# Patient Record
Sex: MALE | Race: OTHER | HISPANIC OR LATINO | URBAN - METROPOLITAN AREA
[De-identification: names, ages, dates, MRNs, and addresses within clinical notes are randomized per-mention and may not be internally consistent; named-entity substitution may affect disease eponyms.]

---

## 2023-11-18 ENCOUNTER — EMERGENCY (EMERGENCY)
Facility: HOSPITAL | Age: 33
LOS: 0 days | Discharge: HOME | End: 2023-11-18
Attending: EMERGENCY MEDICINE
Payer: MEDICAID

## 2023-11-18 VITALS
RESPIRATION RATE: 18 BRPM | SYSTOLIC BLOOD PRESSURE: 134 MMHG | HEIGHT: 67 IN | WEIGHT: 160.06 LBS | OXYGEN SATURATION: 99 % | DIASTOLIC BLOOD PRESSURE: 76 MMHG | TEMPERATURE: 99 F | HEART RATE: 89 BPM

## 2023-11-18 DIAGNOSIS — M25.562 PAIN IN LEFT KNEE: ICD-10-CM

## 2023-11-18 DIAGNOSIS — Z98.890 OTHER SPECIFIED POSTPROCEDURAL STATES: ICD-10-CM

## 2023-11-18 DIAGNOSIS — M25.561 PAIN IN RIGHT KNEE: ICD-10-CM

## 2023-11-18 PROCEDURE — 73562 X-RAY EXAM OF KNEE 3: CPT | Mod: 50

## 2023-11-18 PROCEDURE — 99283 EMERGENCY DEPT VISIT LOW MDM: CPT | Mod: 25

## 2023-11-18 PROCEDURE — 73562 X-RAY EXAM OF KNEE 3: CPT | Mod: 26,50

## 2023-11-18 PROCEDURE — 99284 EMERGENCY DEPT VISIT MOD MDM: CPT

## 2023-11-18 RX ORDER — IBUPROFEN 200 MG
600 TABLET ORAL ONCE
Refills: 0 | Status: COMPLETED | OUTPATIENT
Start: 2023-11-18 | End: 2023-11-18

## 2023-11-18 RX ORDER — ACETAMINOPHEN 500 MG
975 TABLET ORAL ONCE
Refills: 0 | Status: COMPLETED | OUTPATIENT
Start: 2023-11-18 | End: 2023-11-18

## 2023-11-18 RX ADMIN — Medication 975 MILLIGRAM(S): at 13:41

## 2023-11-18 RX ADMIN — Medication 600 MILLIGRAM(S): at 13:41

## 2023-11-18 NOTE — ED PROVIDER NOTE - ATTENDING APP SHARED VISIT CONTRIBUTION OF CARE
32 yo m with pmh of L acl tear, presents with R knee pain, worse since last night.  denies calf pain or swelling.  pain says worse on walking or when straightening the knee.  no trauma.  exam: R knee mildly ttp superior and lateral aspect, mild swelling, FROM passively but with pain on extension, no calf ttp imp: pt with R knee pain, atraumatic, will xray

## 2023-11-18 NOTE — ED PROVIDER NOTE - OBJECTIVE STATEMENT
33-year-old male history of ACL tear presenting to ED for evaluation of bilateral knee pain with swelling.  Patient denies trauma to the knees but states that he was trying to get off of bed and felt sharp pain in his right knee.  Patient has history of ACL tear in his left leg and so he uses his right leg predominantly for getting around.  States he is now having trouble bending the right knee.

## 2023-11-18 NOTE — ED PROVIDER NOTE - PATIENT PORTAL LINK FT
You can access the FollowMyHealth Patient Portal offered by [facility] by registering at the following website: [email or web address]. By joining [facility]’s FollowMyHealth portal, you will also be able to view your health information using other applications (apps) compatible with our system.

## 2023-11-18 NOTE — ED PROVIDER NOTE - NSFOLLOWUPINSTRUCTIONS_ED_ALL_ED_FT
Alannah un seguimiento con ortopedia en 1 a 3 días.    ******** Nuestros coordinadores de referencias del [facility] se comunicarán con usted en las próximas 24 a 48 horas, de 9:00 a. m. a 5:00 p. m., con jewel doe de seguimiento. Espere jewel llamada telefónica del hospital en jameel período de tiempo. Si no recibe jewel llamada o si tiene alguna pregunta o inquietud, puede comunicarse con ellos al [phone].    Dolor payam de rodilla en los adultos  Acute Knee Pain, Adult  El dolor payam de rodilla es repentino y puede deberse a daño, hinchazón o irritación de los músculos y tejidos que sostienen la rodilla. El dolor puede ser consecuencia de lo siguiente:  Jewel caída.  Jewel lesión en la rodilla debido a movimientos de rotación.  Un golpe en la rodilla.  Jewel infección.  El dolor payam de rodilla puede desaparecer solo con el tiempo y el descanso. De no ser así, dow médico podría indicarle que se alannah estudios para hallar la causa del dolor. Pueden incluir:  Estudios de diagnóstico por imágenes, jr jewel radiografía, jewel resonancia magnética (RM), jewel exploración por tomografía computarizada (TC) o jewel ecografía.  Aspiración de jewel articulación. En justin estudio, se extrae líquido de la rodilla y se evalúa.  Artroscopia. En justin estudio, se introduce un tubo iluminado en la rodilla y se proyecta jewel imagen en jewel pantalla de televisión.  Biopsia. En justin estudio, se everton jewel muestra de tejido del organismo y se la estudia con un microscopio.  Siga estas instrucciones en dow casa:  Si tiene jewel rodillera o un dispositivo ortopédico:      Use la rodillera o el dispositivo ortopédico jr se lo haya indicado el médico. Quíteselos solamente jr se lo haya indicado el médico.  Aflójelos si siente hormigueo en los dedos del pie, se le adormecen o se le enfrían y se tornan azulados.  Manténgalos limpios.  Si la rodillera o el dispositivo ortopédico no son impermeables:  No deje que se mojen.  Cúbralos con un envoltorio hermético cuando tome un baño de inmersión o jewel ducha.  Actividad    Descanse la rodilla.  No alannah cosas que le causen dolor o que lo intensifiquen.  Evite las actividades o los ejercicios de alto impacto, jr correr, saltar la soga o hacer rex de tijera.  Trabaje con un fisioterapeuta para crear un programa de ejercicios seguros, según lo recomiende el médico. Alannah ejercicios jr se lo haya indicado el fisioterapeuta.  Control del dolor, la rigidez y la hinchazón      Si se lo indican, aplique hielo sobre la rodilla afectada. Para hacer esto:  Si usa jewel rodillera o un dispositivo ortopédico desmontables, quíteselos según las indicaciones del médico.  Ponga el hielo en jewel bolsa plástica.  Coloque jewel toalla entre la piel y la bolsa.  Aplique el hielo melissa 20 minutos, 2 o 3 veces por día.  Retire el hielo si la piel se pone de color corona brillante. [place] es muy importante. Si no puede sentir dolor, calor o frío, tiene un mayor riesgo de que se dañe la cuate.  Si se lo indican, use jewel venda elástica para ejercer presión (compresión) en la rodilla lesionada. [place] puede controlar la hinchazón, darle sostén y ayudar a aliviar las molestias.  Cuando esté sentado o acostado, levante (eleve) la rodilla por encima del nivel del corazón.  Duerma con jewel almohada debajo de la rodilla.  Indicaciones generales    Use los medicamentos de venta baldo y los recetados solamente jr se lo haya indicado el médico.  No consuma ningún producto que contenga nicotina o tabaco, jr cigarrillos, cigarrillos electrónicos y tabaco de mascar. Si necesita ayuda para dejar de consumir estos productos, consulte al médico.  Si tiene sobrepeso, trabaje con el médico y un nutricionista para establecer jewel meta de pérdida de peso que sea saludable y razonable para usted. El exceso de peso puede generar presión en la rodilla.  Esté atento a cualquier cambio en los síntomas.  Cumpla con todas las visitas de seguimiento. [place] es importante.  Comuníquese con un médico si:  El dolor de rodilla continúa, cambia o empeora.  Tiene fiebre junto con dolor de rodilla.  La rodilla se siente caliente al tacto o está enrojecida.  La rodilla se le tuerce o se le traba.  Solicite ayuda de inmediato si:  La rodilla se hincha, y la hinchazón empeora.  No puede  la rodilla.  Tiene un dolor intenso en la rodilla que no se puede controlar con analgésicos.  Resumen  El dolor payam de rodilla puede deberse a jewel caída, jewel lesión, jewel infección o a daño, hinchazón o irritación de los tejidos que sostienen la rodilla.  El médico podría hacerle estudios para hallar la causa del dolor.  Esté atento a cualquier cambio en los síntomas. Alivie el dolor con descanso, medicamentos, actividad de poca intensidad y el uso de hielo.  Obtenga ayuda de inmediato si la rodilla se hincha, no puede  la rodilla o tiene un dolor intenso que no se puede controlar con medicamentos.

## 2023-11-18 NOTE — ED PROVIDER NOTE - CARE PROVIDER_API CALL
Ghulam Ngo  Orthopaedic Surgery  [address] [place] [address]  Phone: [phone]  Fax: [phone]  Follow Up Time:

## 2023-11-18 NOTE — ED PROVIDER NOTE - CLINICAL SUMMARY MEDICAL DECISION MAKING FREE TEXT BOX
Pt with atraumatic knee pain, xrays neg.  pt to f/u with ortho outpt. Pt was given strict return to ED precautions and pt indicated that he/she understood.

## 2023-11-18 NOTE — ED PROVIDER NOTE - PHYSICAL EXAMINATION
VITAL SIGNS: I have reviewed nursing notes and confirm.  CONSTITUTIONAL: in no acute distress.  SKIN: Skin exam is warm and dry, no acute rash.  HEAD: Normocephalic; atraumatic.  EYES: EOM intact; conjunctiva and sclera clear.  ENT: No nasal discharge; airway clear.   NECK: Supple; non tender.  CARD: S1, S2 normal; no murmurs, gallops, or rubs. Regular rate and rhythm.  RESP: No wheezes, rales or rhonchi. Speaking in full sentences.   ABD: Normal bowel sounds; soft; non-distended; non-tender  EXT: decreased ROM to right leg, pain with flexion of right leg. No clubbing, cyanosis or edema.  NEURO: Alert, oriented. Grossly unremarkable. No focal deficits.

## 2023-11-21 ENCOUNTER — APPOINTMENT (OUTPATIENT)
Dept: ORTHOPEDIC SURGERY | Facility: CLINIC | Age: 33
End: 2023-11-21
Payer: MEDICAID

## 2023-11-21 DIAGNOSIS — S83.91XA SPRAIN OF UNSPECIFIED SITE OF RIGHT KNEE, INITIAL ENCOUNTER: ICD-10-CM

## 2023-11-21 DIAGNOSIS — S83.92XA SPRAIN OF UNSPECIFIED SITE OF LEFT KNEE, INITIAL ENCOUNTER: ICD-10-CM

## 2023-11-21 PROCEDURE — 99203 OFFICE O/P NEW LOW 30 MIN: CPT

## 2023-12-12 ENCOUNTER — APPOINTMENT (OUTPATIENT)
Dept: ORTHOPEDIC SURGERY | Facility: CLINIC | Age: 33
End: 2023-12-12

## 2023-12-19 ENCOUNTER — APPOINTMENT (OUTPATIENT)
Dept: INTERNAL MEDICINE | Facility: CLINIC | Age: 33
End: 2023-12-19
Payer: MEDICAID

## 2023-12-19 ENCOUNTER — OUTPATIENT (OUTPATIENT)
Dept: OUTPATIENT SERVICES | Facility: HOSPITAL | Age: 33
LOS: 1 days | End: 2023-12-19
Payer: MEDICAID

## 2023-12-19 VITALS
DIASTOLIC BLOOD PRESSURE: 85 MMHG | SYSTOLIC BLOOD PRESSURE: 133 MMHG | TEMPERATURE: 97.9 F | HEART RATE: 72 BPM | WEIGHT: 158 LBS | OXYGEN SATURATION: 98 %

## 2023-12-19 VITALS — HEIGHT: 64 IN | BODY MASS INDEX: 27.12 KG/M2

## 2023-12-19 DIAGNOSIS — Z78.9 OTHER SPECIFIED HEALTH STATUS: ICD-10-CM

## 2023-12-19 DIAGNOSIS — Z00.00 ENCOUNTER FOR GENERAL ADULT MEDICAL EXAMINATION WITHOUT ABNORMAL FINDINGS: ICD-10-CM

## 2023-12-19 DIAGNOSIS — Z23 ENCOUNTER FOR IMMUNIZATION: ICD-10-CM

## 2023-12-19 DIAGNOSIS — G43.909 MIGRAINE, UNSPECIFIED, NOT INTRACTABLE, W/OUT STATUS MIGRAINOSUS: ICD-10-CM

## 2023-12-19 DIAGNOSIS — Z00.00 ENCOUNTER FOR GENERAL ADULT MEDICAL EXAMINATION W/OUT ABNORMAL FINDINGS: ICD-10-CM

## 2023-12-19 PROCEDURE — 99203 OFFICE O/P NEW LOW 30 MIN: CPT

## 2023-12-19 PROCEDURE — 90471 IMMUNIZATION ADMIN: CPT | Mod: 25

## 2023-12-19 RX ORDER — MELOXICAM 7.5 MG/1
7.5 TABLET ORAL
Qty: 90 | Refills: 0 | Status: ACTIVE | COMMUNITY
Start: 2023-12-19 | End: 1900-01-01

## 2023-12-19 RX ORDER — ONDANSETRON 4 MG/1
4 TABLET ORAL EVERY 6 HOURS
Qty: 240 | Refills: 1 | Status: ACTIVE | COMMUNITY
Start: 2023-12-19 | End: 1900-01-01

## 2023-12-19 RX ORDER — ELECTROLYTES/DEXTROSE
SOLUTION, ORAL ORAL
Qty: 90 | Refills: 1 | Status: ACTIVE | COMMUNITY
Start: 2023-12-19 | End: 1900-01-01

## 2023-12-19 NOTE — ASSESSMENT
[FreeTextEntry1] : 33y M [place] speaker with no significant PMH here to establish care. Following with ortho for a recent b//l knee sprain s/p fall.  #Migraine s/p head trauma #Nausea - start meloxicam - start odansetron - f/u [facility] - neurology referral given   #Knee sprain - still having pain, no prior meds - following with ortho  #HCM - flu vax given at this visit - Start multivitamin - RTC 6 months and prn

## 2023-12-19 NOTE — REVIEW OF SYSTEMS
[Headache] : headache [Dizziness] : dizziness [Memory Loss] : memory loss [Negative] : Heme/Lymph [Pain] : no pain [Vision Problems] : no vision problems [Hearing Loss] : no hearing loss [Fainting] : no fainting [Unsteady Walk] : no ataxia [Anxiety] : no anxiety [de-identified] : Headache for 1.5 years lasts 4 hours exacerbated by stress, forgets what he is getting at store when he goes
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
## 2023-12-20 ENCOUNTER — NON-APPOINTMENT (OUTPATIENT)
Age: 33
End: 2023-12-20

## 2023-12-21 DIAGNOSIS — Z78.9 OTHER SPECIFIED HEALTH STATUS: ICD-10-CM

## 2023-12-21 DIAGNOSIS — Z23 ENCOUNTER FOR IMMUNIZATION: ICD-10-CM

## 2023-12-21 DIAGNOSIS — G43.909 MIGRAINE, UNSPECIFIED, NOT INTRACTABLE, WITHOUT STATUS MIGRAINOSUS: ICD-10-CM

## 2023-12-21 DIAGNOSIS — Z00.00 ENCOUNTER FOR GENERAL ADULT MEDICAL EXAMINATION WITHOUT ABNORMAL FINDINGS: ICD-10-CM

## 2024-03-19 ENCOUNTER — APPOINTMENT (OUTPATIENT)
Dept: NEUROLOGY | Facility: CLINIC | Age: 34
End: 2024-03-19

## 2024-09-30 ENCOUNTER — APPOINTMENT (OUTPATIENT)
Dept: INTERNAL MEDICINE | Facility: CLINIC | Age: 34
End: 2024-09-30